# Patient Record
Sex: MALE | Race: WHITE | Employment: UNEMPLOYED | URBAN - METROPOLITAN AREA
[De-identification: names, ages, dates, MRNs, and addresses within clinical notes are randomized per-mention and may not be internally consistent; named-entity substitution may affect disease eponyms.]

---

## 2017-03-01 ENCOUNTER — PROCEDURE VISIT (OUTPATIENT)
Dept: CARDIOLOGY CLINIC | Age: 32
End: 2017-03-01

## 2017-03-01 ENCOUNTER — OFFICE VISIT (OUTPATIENT)
Dept: CARDIOLOGY CLINIC | Age: 32
End: 2017-03-01

## 2017-03-01 VITALS
HEIGHT: 67 IN | WEIGHT: 199 LBS | BODY MASS INDEX: 31.23 KG/M2 | SYSTOLIC BLOOD PRESSURE: 122 MMHG | HEART RATE: 78 BPM | DIASTOLIC BLOOD PRESSURE: 70 MMHG | OXYGEN SATURATION: 97 %

## 2017-03-01 DIAGNOSIS — I42.8 CARDIOMYOPATHY, NONISCHEMIC (HCC): ICD-10-CM

## 2017-03-01 DIAGNOSIS — Z13.9 SCREENING: ICD-10-CM

## 2017-03-01 DIAGNOSIS — E78.5 HYPERLIPIDEMIA, UNSPECIFIED HYPERLIPIDEMIA TYPE: ICD-10-CM

## 2017-03-01 DIAGNOSIS — I50.22 CHRONIC SYSTOLIC HEART FAILURE (HCC): ICD-10-CM

## 2017-03-01 DIAGNOSIS — Z95.810 CARDIAC DEFIBRILLATOR IN SITU: ICD-10-CM

## 2017-03-01 DIAGNOSIS — Q23.1 BICUSPID AORTIC VALVE: Primary | ICD-10-CM

## 2017-03-01 DIAGNOSIS — Z95.810 CARDIAC DEFIBRILLATOR IN SITU: Primary | ICD-10-CM

## 2017-03-01 DIAGNOSIS — Z95.2 S/P AVR (AORTIC VALVE REPLACEMENT): ICD-10-CM

## 2017-03-01 PROCEDURE — 93283 PRGRMG EVAL IMPLANTABLE DFB: CPT | Performed by: INTERNAL MEDICINE

## 2017-03-01 PROCEDURE — G8417 CALC BMI ABV UP PARAM F/U: HCPCS | Performed by: INTERNAL MEDICINE

## 2017-03-01 PROCEDURE — 99214 OFFICE O/P EST MOD 30 MIN: CPT | Performed by: INTERNAL MEDICINE

## 2017-03-01 PROCEDURE — 93290 INTERROG DEV EVAL ICPMS IP: CPT | Performed by: INTERNAL MEDICINE

## 2017-03-01 PROCEDURE — 4004F PT TOBACCO SCREEN RCVD TLK: CPT | Performed by: INTERNAL MEDICINE

## 2017-03-01 PROCEDURE — G8484 FLU IMMUNIZE NO ADMIN: HCPCS | Performed by: INTERNAL MEDICINE

## 2017-03-01 PROCEDURE — G8427 DOCREV CUR MEDS BY ELIG CLIN: HCPCS | Performed by: INTERNAL MEDICINE

## 2017-03-01 RX ORDER — LISINOPRIL 2.5 MG/1
2.5 TABLET ORAL DAILY
Qty: 30 TABLET | Refills: 3 | Status: SHIPPED | OUTPATIENT
Start: 2017-03-01 | End: 2017-12-29 | Stop reason: SDUPTHER

## 2017-03-01 RX ORDER — CARVEDILOL 6.25 MG/1
6.25 TABLET ORAL 2 TIMES DAILY WITH MEALS
Qty: 60 TABLET | Refills: 11 | Status: SHIPPED | OUTPATIENT
Start: 2017-03-01 | End: 2018-04-10 | Stop reason: SDUPTHER

## 2017-04-20 ENCOUNTER — TELEPHONE (OUTPATIENT)
Dept: CARDIOLOGY CLINIC | Age: 32
End: 2017-04-20

## 2017-04-21 RX ORDER — FUROSEMIDE 20 MG/1
20 TABLET ORAL DAILY
Qty: 30 TABLET | Refills: 6 | Status: SHIPPED | OUTPATIENT
Start: 2017-04-21 | End: 2018-02-05 | Stop reason: SDUPTHER

## 2017-12-28 ENCOUNTER — TELEPHONE (OUTPATIENT)
Dept: CARDIOLOGY CLINIC | Age: 32
End: 2017-12-28

## 2017-12-28 DIAGNOSIS — I42.8 CARDIOMYOPATHY, NONISCHEMIC (HCC): ICD-10-CM

## 2017-12-28 DIAGNOSIS — I50.22 CHRONIC SYSTOLIC HEART FAILURE (HCC): ICD-10-CM

## 2017-12-28 DIAGNOSIS — Z95.2 S/P AVR (AORTIC VALVE REPLACEMENT): ICD-10-CM

## 2017-12-29 RX ORDER — LISINOPRIL 2.5 MG/1
2.5 TABLET ORAL DAILY
Qty: 30 TABLET | Refills: 0 | Status: SHIPPED | OUTPATIENT
Start: 2017-12-29 | End: 2018-02-05 | Stop reason: SDUPTHER

## 2018-01-30 ENCOUNTER — OFFICE VISIT (OUTPATIENT)
Dept: CARDIOLOGY CLINIC | Age: 33
End: 2018-01-30

## 2018-01-30 ENCOUNTER — PROCEDURE VISIT (OUTPATIENT)
Dept: CARDIOLOGY CLINIC | Age: 33
End: 2018-01-30

## 2018-01-30 ENCOUNTER — TELEPHONE (OUTPATIENT)
Dept: CARDIOLOGY CLINIC | Age: 33
End: 2018-01-30

## 2018-01-30 VITALS
BODY MASS INDEX: 29.57 KG/M2 | SYSTOLIC BLOOD PRESSURE: 118 MMHG | DIASTOLIC BLOOD PRESSURE: 88 MMHG | HEART RATE: 105 BPM | WEIGHT: 184 LBS | HEIGHT: 66 IN | OXYGEN SATURATION: 97 %

## 2018-01-30 DIAGNOSIS — I42.8 CARDIOMYOPATHY, NONISCHEMIC (HCC): Primary | ICD-10-CM

## 2018-01-30 DIAGNOSIS — I35.0 NONRHEUMATIC AORTIC VALVE STENOSIS: ICD-10-CM

## 2018-01-30 DIAGNOSIS — Z45.02 ICD (IMPLANTABLE CARDIOVERTER-DEFIBRILLATOR) BATTERY DEPLETION: ICD-10-CM

## 2018-01-30 DIAGNOSIS — I49.01 VF (VENTRICULAR FIBRILLATION) (HCC): ICD-10-CM

## 2018-01-30 DIAGNOSIS — I50.22 CHRONIC SYSTOLIC HEART FAILURE (HCC): ICD-10-CM

## 2018-01-30 DIAGNOSIS — I42.8 CARDIOMYOPATHY, NONISCHEMIC (HCC): ICD-10-CM

## 2018-01-30 DIAGNOSIS — Z95.2 S/P AVR: ICD-10-CM

## 2018-01-30 DIAGNOSIS — I42.8 NON-ISCHEMIC CARDIOMYOPATHY (HCC): Primary | ICD-10-CM

## 2018-01-30 DIAGNOSIS — Z95.2 S/P AVR (AORTIC VALVE REPLACEMENT): ICD-10-CM

## 2018-01-30 DIAGNOSIS — Z95.810 CARDIAC DEFIBRILLATOR IN SITU: ICD-10-CM

## 2018-01-30 PROCEDURE — G8419 CALC BMI OUT NRM PARAM NOF/U: HCPCS | Performed by: INTERNAL MEDICINE

## 2018-01-30 PROCEDURE — G8428 CUR MEDS NOT DOCUMENT: HCPCS | Performed by: INTERNAL MEDICINE

## 2018-01-30 PROCEDURE — 93283 PRGRMG EVAL IMPLANTABLE DFB: CPT | Performed by: INTERNAL MEDICINE

## 2018-01-30 PROCEDURE — 93290 INTERROG DEV EVAL ICPMS IP: CPT | Performed by: INTERNAL MEDICINE

## 2018-01-30 PROCEDURE — G8484 FLU IMMUNIZE NO ADMIN: HCPCS | Performed by: INTERNAL MEDICINE

## 2018-01-30 PROCEDURE — 99999 PR OFFICE/OUTPT VISIT,PROCEDURE ONLY: CPT | Performed by: NURSE PRACTITIONER

## 2018-01-30 PROCEDURE — 99205 OFFICE O/P NEW HI 60 MIN: CPT | Performed by: INTERNAL MEDICINE

## 2018-01-30 PROCEDURE — 4004F PT TOBACCO SCREEN RCVD TLK: CPT | Performed by: INTERNAL MEDICINE

## 2018-01-30 NOTE — PROGRESS NOTES
[address]   Electrophysiology Consultation   Date: 1/30/2018  Reason for Consultation: AICD battery depletion     CC: Fall   HPI: Jesi Levine II is a 34 y.o. history of non-ischemic cardiomyopathy with LV dysfunction s/p AICD implantation 2/15/2010 br Dr Chris Allison at [facility], history of AICD shock in the past per patient, history of bicuspid aortic valve s/p valve replacement at [facility] here today and found to have his device battery is depleted. He has CLARK. He states that he is compliant with his medical therapy. Past Medical History:   Diagnosis Date    Aortic stenosis     CHF (congestive heart failure) (HCC)     Hypertension         Past Surgical History:   Procedure Laterality Date    AORTIC VALVE SURGERY      CARDIAC VALVE REPLACEMENT      big valve     PACEMAKER PLACEMENT      ICD     Allergies   Allergen Reactions    Flexeril [Cyclobenzaprine]        Social History:   reports that he has never smoked. He uses smokeless tobacco. He reports that he drinks alcohol. He reports that he does not use drugs. Family History:  family history includes Heart Disease in his paternal grandmother. Review of System:  All other systems reviewed and are negative except for that noted above. Pertinent negatives are:   General: negative for fever, chills   Ophthalmic ROS: negative for - eye pain or loss of vision  ENT ROS: negative for - headaches, sore throat   Respiratory: negative for - cough, sputum  Cardiovascular: Reviewed in HPI  Gastrointestinal: negative for - abdominal pain, diarrhea, N/V  Hematology: negative for - bleeding, blood clots, bruising or jaundice  Genito-Urinary:  negative for - Dysuria or incontinence  Musculoskeletal: negative for - Joint swelling, muscle pain  Neurological: negative for - confusion, dizziness, headaches   Psychiatric: No anxiety, no depression. Dermatological: negative for - rash    Physical Examination:  There were no vitals filed for this visit.

## 2018-01-30 NOTE — TELEPHONE ENCOUNTER
DANA spoke to key in Cath lab, this fella needs gen change which will be done tomorrow. Not sure if you ladies need to know!     Thanks

## 2018-01-30 NOTE — PROGRESS NOTES
Pt seen by DR. Wyatt Amin today, after device interrogated and found to be EOL, and has been EOL for many months, interrogation showed device shock for VT/VF event. Risks, options, benefits discussed with him by me (with witness Maty Vega) and then by DR. Wyatt Amin with me in room  D/w him need for device replacement, he could stay today and get done today but he wants to go home and come back tomorrow for change.   He understands risk of SCD and risks of not getting done today

## 2018-02-01 NOTE — COMMUNICATION BODY
Pt seen by DR. Edmund Joseph today, after device interrogated and found to be EOL, and has been EOL for many months, interrogation showed device shock for VT/VF event. Risks, options, benefits discussed with him by me (with witness Corrine Banda) and then by DR. Edmund Joseph with me in room  D/w him need for device replacement, he could stay today and get done today but he wants to go home and come back tomorrow for change.   He understands risk of SCD and risks of not getting done today

## 2018-02-05 DIAGNOSIS — I50.22 CHRONIC SYSTOLIC HEART FAILURE (HCC): ICD-10-CM

## 2018-02-05 DIAGNOSIS — I42.8 CARDIOMYOPATHY, NONISCHEMIC (HCC): ICD-10-CM

## 2018-02-05 DIAGNOSIS — Z95.2 S/P AVR (AORTIC VALVE REPLACEMENT): ICD-10-CM

## 2018-02-05 RX ORDER — FUROSEMIDE 20 MG/1
20 TABLET ORAL DAILY
Qty: 30 TABLET | Refills: 6 | Status: SHIPPED | OUTPATIENT
Start: 2018-02-05 | End: 2018-04-10 | Stop reason: SDUPTHER

## 2018-02-05 RX ORDER — LISINOPRIL 2.5 MG/1
2.5 TABLET ORAL DAILY
Qty: 30 TABLET | Refills: 0 | Status: SHIPPED | OUTPATIENT
Start: 2018-02-05 | End: 2018-04-10 | Stop reason: SDUPTHER

## 2018-04-10 DIAGNOSIS — Z95.2 S/P AVR (AORTIC VALVE REPLACEMENT): ICD-10-CM

## 2018-04-10 DIAGNOSIS — I50.22 CHRONIC SYSTOLIC HEART FAILURE (HCC): ICD-10-CM

## 2018-04-10 DIAGNOSIS — I42.8 CARDIOMYOPATHY, NONISCHEMIC (HCC): ICD-10-CM

## 2018-04-10 RX ORDER — CARVEDILOL 6.25 MG/1
6.25 TABLET ORAL 2 TIMES DAILY WITH MEALS
Qty: 60 TABLET | Refills: 11 | Status: SHIPPED | OUTPATIENT
Start: 2018-04-10 | End: 2019-05-29 | Stop reason: ALTCHOICE

## 2018-04-10 RX ORDER — LISINOPRIL 2.5 MG/1
2.5 TABLET ORAL DAILY
Qty: 30 TABLET | Refills: 11 | Status: SHIPPED | OUTPATIENT
Start: 2018-04-10 | End: 2019-05-29 | Stop reason: ALTCHOICE

## 2018-04-10 RX ORDER — FUROSEMIDE 20 MG/1
20 TABLET ORAL DAILY
Qty: 30 TABLET | Refills: 11 | Status: SHIPPED | OUTPATIENT
Start: 2018-04-10 | End: 2019-05-29 | Stop reason: ALTCHOICE

## 2019-05-23 RX ORDER — FUROSEMIDE 20 MG/1
TABLET ORAL
Qty: 30 TABLET | Refills: 10 | OUTPATIENT
Start: 2019-05-23

## 2019-05-29 ENCOUNTER — PROCEDURE VISIT (OUTPATIENT)
Dept: CARDIOLOGY CLINIC | Age: 34
End: 2019-05-29
Payer: MEDICARE

## 2019-05-29 ENCOUNTER — OFFICE VISIT (OUTPATIENT)
Dept: CARDIOLOGY CLINIC | Age: 34
End: 2019-05-29
Payer: MEDICARE

## 2019-05-29 VITALS
HEART RATE: 95 BPM | HEIGHT: 66 IN | OXYGEN SATURATION: 97 % | BODY MASS INDEX: 29.7 KG/M2 | SYSTOLIC BLOOD PRESSURE: 110 MMHG | DIASTOLIC BLOOD PRESSURE: 80 MMHG

## 2019-05-29 DIAGNOSIS — I49.01 VF (VENTRICULAR FIBRILLATION) (HCC): Primary | ICD-10-CM

## 2019-05-29 DIAGNOSIS — I42.8 NON-ISCHEMIC CARDIOMYOPATHY (HCC): ICD-10-CM

## 2019-05-29 DIAGNOSIS — Z95.810 CARDIAC DEFIBRILLATOR IN SITU: ICD-10-CM

## 2019-05-29 DIAGNOSIS — I50.22 CHRONIC SYSTOLIC HEART FAILURE (HCC): ICD-10-CM

## 2019-05-29 PROCEDURE — 99213 OFFICE O/P EST LOW 20 MIN: CPT | Performed by: NURSE PRACTITIONER

## 2019-05-29 PROCEDURE — G8419 CALC BMI OUT NRM PARAM NOF/U: HCPCS | Performed by: NURSE PRACTITIONER

## 2019-05-29 PROCEDURE — G8427 DOCREV CUR MEDS BY ELIG CLIN: HCPCS | Performed by: NURSE PRACTITIONER

## 2019-05-29 PROCEDURE — 4004F PT TOBACCO SCREEN RCVD TLK: CPT | Performed by: NURSE PRACTITIONER

## 2019-05-29 PROCEDURE — 93283 PRGRMG EVAL IMPLANTABLE DFB: CPT | Performed by: INTERNAL MEDICINE

## 2019-05-29 PROCEDURE — 93290 INTERROG DEV EVAL ICPMS IP: CPT | Performed by: INTERNAL MEDICINE

## 2019-05-29 PROCEDURE — 93000 ELECTROCARDIOGRAM COMPLETE: CPT | Performed by: NURSE PRACTITIONER

## 2019-05-29 RX ORDER — FUROSEMIDE 20 MG/1
TABLET ORAL
Qty: 60 TABLET | Refills: 1 | Status: SHIPPED | OUTPATIENT
Start: 2019-05-29 | End: 2019-06-24 | Stop reason: SDUPTHER

## 2019-05-29 RX ORDER — CARVEDILOL 6.25 MG/1
6.25 TABLET ORAL DAILY
Qty: 30 TABLET | Refills: 5 | Status: SHIPPED | OUTPATIENT
Start: 2019-05-29 | End: 2019-06-25 | Stop reason: DRUGHIGH

## 2019-05-29 NOTE — PROGRESS NOTES
Patient comes in for programming evaluation for his defibrillator. All sensing and pacing parameters are within normal range. Pt has not been seen since January 2018. He has multiple recordings of SVT. He was shocked for VT back in November. His optivol is elevated. He states he has gained 30 pounds in past few weeks. Hes been off all his meds as well. No changes need to be made at this time. Please see interrogation for more detail. Patient will see Fatmata Miner today.

## 2019-05-29 NOTE — PROGRESS NOTES
Allergies: Allergies   Allergen Reactions    Flexeril [Cyclobenzaprine]        Social History:  Reviewed. reports that he has never smoked. He uses smokeless tobacco. He reports that he drinks alcohol. He reports that he does not use drugs. Family History:  Reviewed. family history includes Heart Disease in his paternal grandmother. Review of System:  All other systems reviewed and are negative except for that noted above. Pertinent negatives are:     · General: negative for fever, chills   · Ophthalmic ROS: negative for - eye pain or loss of vision  · ENT ROS: negative for - headaches, sore throat   · Respiratory: negative for - cough, sputum  · Cardiovascular: Reviewed in HPI  · Gastrointestinal: negative for - abdominal pain, diarrhea, N/V  · Hematology: negative for - bleeding, blood clots, bruising or jaundice  · Genito-Urinary:  negative for - Dysuria or incontinence  · Musculoskeletal: negative for - Joint swelling, muscle pain  · Neurological: negative for - confusion, dizziness, headaches   · Psychiatric: No anxiety, no depression. · Dermatological: negative for - rash    Physical Examination:  Vitals:    05/29/19 1509   BP: 110/80   Pulse: 95   SpO2: 97%        · Constitutional: Oriented. No distress. · Head: Normocephalic and atraumatic. · Mouth/Throat: Oropharynx is clear and moist.   · Eyes: Conjunctivae normal. EOM are normal.   · Neck: Neck supple. No rigidity. No JVD present. · Cardiovascular: Normal rate, regular rhythm, S1&S2. · Pulmonary/Chest: Bilateral respiratory sounds. No wheezes, No rhonchi. · Abdominal: Soft. Bowel sounds present. No distension, No tenderness. · Musculoskeletal: No tenderness. No edema    · Lymphadenopathy: Has no cervical adenopathy. · Neurological: Alert and oriented. Cranial nerve appears intact, No Gross deficit   · Skin: Skin is warm and dry. No rash noted.    · Psychiatric: Has a normal behavior     Labs, diagnostic and imaging results reviewed. Last Echo EF 30%    Medication:  Current Outpatient Medications   Medication Sig Dispense Refill    carvedilol (COREG) 6.25 MG tablet Take 1 tablet by mouth daily 30 tablet 5    furosemide (LASIX) 20 MG tablet Take 1 tablet twice a day for 3 days then decrease to once a day 60 tablet 1    aspirin 81 MG tablet Take 81 mg by mouth daily. No current facility-administered medications for this visit. Previous OV Assessment and plan 1/31/18   - Ventricular fibrillation:               Device interrogated today. One episode of VF on 9/2/2017 detected and terminated with AICD shock. Patient reports syncope and fall at the time. History of severe LV dysfunction and cardiomyopathy. Continue with aggressive medical therapy and risk factor modifications. Coreg              Lisinopril               ASA                  If recurrent episodes, will consider amiodarone, although he is young and amiodarone has toxicity.      - Non-Ischemic cardiomyopathy s/p AICD in 2010, EF: 30%, NYHAFC II               Episode of VF treated with AICD shock. · Minimal pacing. · Device battery is EOS and needs to be replaced. Discussed with patient. Risks, benefits and alternative of procedure were explained. All questions answered. Initially he was hesitant to proceed with it. He appears to be depressed. Risk of SCD discussed in detail. He verbalized understanding. Recommended changing pulse generator today and offered admission but he declined. He has had VF and replacing generator for secondary prevention is needed. Scheduled patient for generator change out tomorrow and he agreed with proceeding with it. Bicuspid aortic valve - congenital. S/p aortic valvuloplasty as an infant and had porcine AVR in 1/2014 at [facility].       Today's OV Assessment/Plan  Presents today for medication refills and device interrogation. Had gen change 1/31/18 and has not followed up since. He has been out of all his medications except coreg for 2 months. He has been taking 1 Coreg daily to make it last longer. He states that he has gained about 30 lbs in @ 2 months. Highly emotional and depressed during office today. He states that he has multiple family, personal and social stressors. He states that he's not important enough to see a Dr. And always gets scheduled w/NPs because the Dr.s don't want to see him. Grossly volume up on physical exam, ble and gut edema. Optivol has been trending up for 6 weeks w/thoracic impedence (-) congruence. Had 1 shock in 11/2018. Multiple PVC's on EKG today. Low EF in addition to fluid overload and med noncompliance are further exacerbating PVCs     --->Hightly recommended inpatient admission, however he refused --long discussion. Mr. Partha Sandoval also declined treatment at the infusion center today, or labs today. 1) Continue Coreg 6.25 mg twice a day, was only taking once a day    2) Lasix 20 mg twice a day for 3 days then once a day, was not taking any for 2 months, would have preferred IV route, but pt refused, also refused labs    3) Please consider where you would like get your labs--it's really important to get them    4) Follow up with Dr. Alexis Simons in 2-4 weeks    5) Follow up with Dr. aKlani Preciado in 3-4 months    6) Will consider restarting lisinopril after labs.   Might consider amiodarone at f/up    -----He is very depressed, refusing admission, refusing labs here today due to prior hematoma, having a a different phylbotimist doesn't change his decision  -----Optimal plan would be for admission and IV diureses  ------No amio today, caution in prescribing drugs w/an OD/proarrythmia potential  ------Denies active suicidal plan, declines psych referral    I independently reviewed*device check interrogation     Over 45 minutes were utilized in face to face discussion NOTE: This report was transcribed using voice recognition software. Every effort was made to ensure accuracy, however, inadvertent computerized transcription errors may be present.        CONNOR Mckeon  [facility]   Office: [phone]

## 2019-06-24 ENCOUNTER — OFFICE VISIT (OUTPATIENT)
Dept: CARDIOLOGY CLINIC | Age: 34
End: 2019-06-24
Payer: MEDICARE

## 2019-06-24 VITALS
WEIGHT: 200.7 LBS | BODY MASS INDEX: 31.5 KG/M2 | OXYGEN SATURATION: 97 % | SYSTOLIC BLOOD PRESSURE: 100 MMHG | HEART RATE: 84 BPM | HEIGHT: 67 IN | DIASTOLIC BLOOD PRESSURE: 64 MMHG

## 2019-06-24 DIAGNOSIS — Z45.02 ICD (IMPLANTABLE CARDIOVERTER-DEFIBRILLATOR) BATTERY DEPLETION: ICD-10-CM

## 2019-06-24 DIAGNOSIS — Z95.2 S/P AVR: ICD-10-CM

## 2019-06-24 DIAGNOSIS — I50.22 CHRONIC SYSTOLIC HEART FAILURE (HCC): Primary | ICD-10-CM

## 2019-06-24 DIAGNOSIS — Z95.810 CARDIAC DEFIBRILLATOR IN SITU: ICD-10-CM

## 2019-06-24 DIAGNOSIS — E03.9 HYPOTHYROIDISM, UNSPECIFIED TYPE: ICD-10-CM

## 2019-06-24 DIAGNOSIS — E78.5 HYPERLIPIDEMIA, UNSPECIFIED HYPERLIPIDEMIA TYPE: ICD-10-CM

## 2019-06-24 DIAGNOSIS — R06.02 SOB (SHORTNESS OF BREATH): ICD-10-CM

## 2019-06-24 PROCEDURE — 99214 OFFICE O/P EST MOD 30 MIN: CPT | Performed by: INTERNAL MEDICINE

## 2019-06-24 PROCEDURE — G8417 CALC BMI ABV UP PARAM F/U: HCPCS | Performed by: INTERNAL MEDICINE

## 2019-06-24 PROCEDURE — G8427 DOCREV CUR MEDS BY ELIG CLIN: HCPCS | Performed by: INTERNAL MEDICINE

## 2019-06-24 RX ORDER — LISINOPRIL 2.5 MG/1
2.5 TABLET ORAL DAILY
Qty: 90 TABLET | Refills: 3 | Status: SHIPPED | OUTPATIENT
Start: 2019-06-24 | End: 2020-07-29 | Stop reason: SDUPTHER

## 2019-06-24 RX ORDER — DIGOXIN 125 MCG
125 TABLET ORAL DAILY
Qty: 90 TABLET | Refills: 3 | Status: SHIPPED | OUTPATIENT
Start: 2019-06-24 | End: 2020-07-24

## 2019-06-24 RX ORDER — CARVEDILOL 6.25 MG/1
6.25 TABLET ORAL 2 TIMES DAILY WITH MEALS
Qty: 180 TABLET | Refills: 3 | Status: SHIPPED | OUTPATIENT
Start: 2019-06-24 | End: 2020-07-29 | Stop reason: SDUPTHER

## 2019-06-24 NOTE — PATIENT INSTRUCTIONS
Plan:  1. Start Digoxin 0.125mg daily. 2.  Take Lasix 20mg daily  3. Increase Coreg to 6.25mg twice daily  4. Start Lisinopril 2.5mg daily  5. Labs fasting (nothing to eat for 8 hours) soon  6. See Dr. Sofia in 2 months with an Echo.

## 2019-06-24 NOTE — PROGRESS NOTES
[facility]  Advanced CHF/Pulmonary Hypertension   Cardiac Evaluation      Julisa Clark II  YOB: 1985    Date of Visit:  6/24/19      Chief Complaint   Patient presents with    Congestive Heart Failure        History of Present Illness:  Julisa Clark II is a 28 y.o. male who presents from referral from Dr. Alfred Mathis for consultation and management of non-ischemic cardiomyopathy with EF 30%. He has a history of non-ischemic cardiomyopathy with LV dysfunction s/p AICD implantation 2/15/2010 br Dr Amelia Fuentes at [facility]. He had a VF episode in September 2017 with syncope treated with AICD shock. History of s/p congenital aortic valvuloplasty as an infant and bicuspid aortic valve s/p valve replacement at [address] [place] [address] in 1-2014. He had generator change on 1- and has not followed up since that time with EP until seen by Roopa Espinal NP in May 2019. At that time, he had been non compliant with medications taking Coreg only once a day to make it last longer, weight up 30 pounds in 2 months, stated increased depression with family stressors and feeling decreased importance to seek treatment recommendation at that time. Today he is here for follow up heart failure. States that lisinopril was stopped. He thinks he misunderstood how to take the lasix and he has lost 5 pounds. The Optivol is improved today from last visit. Impedance monitoring via [employer] Optivol Cardiac Compass was downloaded from patient's ICD and reviewed with the patient. The impedance shows stable fluid level. In May, he was taking Lasix 3 days a week 40mg and 4 days a week, he was taking 20mg. He was in a \"depression patch\" and states he has stretching out his meds and that was \"going to be it. \"  He states he was not on Digoxin in the past.  He is concerned about the cost of meds and the office visit costs. Discussed Entresto this visit but first getting back on a good medication regimen.   He states he Physical activity:     Days per week: Not on file     Minutes per session: Not on file    Stress: Not on file   Relationships    Social connections:     Talks on phone: Not on file     Gets together: Not on file     Attends [employer] service: Not on file     Active member of club or organization: Not on file     Attends meetings of clubs or organizations: Not on file     Relationship status: Not on file    Intimate partner violence:     Fear of current or ex partner: Not on file     Emotionally abused: Not on file     Physically abused: Not on file     Forced sexual activity: Not on file   Other Topics Concern    Not on file   Social History Narrative    Not on file       Review of Systems:   · Constitutional: there has been no unanticipated weight loss. There's been no change in energy level, sleep pattern, or activity level. · Eyes: No visual changes or diplopia. No scleral icterus. · ENT: No Headaches, hearing loss or vertigo. No mouth sores or sore throat. · Cardiovascular: Reviewed in HPI  · Respiratory: No cough or wheezing, no sputum production. No hematemesis. · Gastrointestinal: No abdominal pain, appetite loss, blood in stools. No change in bowel or bladder habits. · Genitourinary: No dysuria, trouble voiding, or hematuria. · Musculoskeletal:  No gait disturbance, weakness or joint complaints. · Integumentary: No rash or pruritis. · Neurological: No headache, diplopia, change in muscle strength, numbness or tingling. No change in gait, balance, coordination, mood, affect, memory, mentation, behavior. · Psychiatric: No anxiety, no depression. · Endocrine: No malaise, fatigue or temperature intolerance. No excessive thirst, fluid intake, or urination. No tremor. · Hematologic/Lymphatic: No abnormal bruising or bleeding, blood clots or swollen lymph nodes. · Allergic/Immunologic: No nasal congestion or hives.     Physical Examination:    Vitals:    06/24/19 0959   BP: 100/64   Pulse: 84 SpO2: 97%   Weight: 200 lb 11.2 oz (91 kg)   Height: 5' 7\" (1.702 m)     Body mass index is 31.43 kg/m². Wt Readings from Last 3 Encounters:   06/24/19 200 lb 11.2 oz (91 kg)   01/31/18 184 lb (83.5 kg)   01/30/18 184 lb (83.5 kg)     BP Readings from Last 3 Encounters:   06/24/19 100/64   05/29/19 110/80   01/30/18 118/88     Constitutional and General Appearance:   WD/WN in NAD  HEENT:  NC/AT  EVA  No problems with hearing  Skin:  Warm, dry  Respiratory:  · Normal excursion and expansion without use of accessory muscles  · Resp Auscultation: Normal breath sounds without dullness  Cardiovascular:  · The apical impulses not displaced  · Heart tones are crisp and normal  · Cervical veins are not engorged  · The carotid upstroke is normal in amplitude and contour without delay or bruit  · JVP less than 8 cm H2O  RRR with nl S1 and S2 without m,r,g  · Peripheral pulses are symmetrical and full  · There is no clubbing, cyanosis of the extremities. · No edema  · Femoral Arteries: 2+ and equal  · Pedal Pulses: 2+ and equal   Neck:  · No thyromegaly  Abdomen:  · No masses or tenderness  · Liver/Spleen: No Abnormalities Noted  Neurological/Psychiatric:  · Alert and oriented in all spheres  · Moves all extremities well  · Exhibits normal gait balance and coordination  · No abnormalities of mood, affect, memory, mentation, or behavior are noted    Procedures performed:  1-  Dr. Maurilio Aguilar  · Generator change out of dual chamber AICD   · Electronic analysis of lead and device. · IV sedation. Echo 3-11-14  LVEF 30%  Mean Gradient 15mmHg  No pulmonary hypertension identified    Labs were reviewed including labs from other hospital systems through [facility]. Cardiac testing was reviewed including echos, nuclear scans, cardiac catheterization, including from other hospital systems through [facility]. Assessment:    1. Chronic systolic heart failure (HCC)    2. Cardiac defibrillator in situ    3.  ICD CHF and aFib on anticoagulation:  NA    I appreciate the opportunity of cooperating in the care of this patient.     Paulino Martinez M.D., [facility]

## 2019-06-26 RX ORDER — FUROSEMIDE 20 MG/1
TABLET ORAL
Qty: 110 TABLET | Refills: 3 | Status: SHIPPED | OUTPATIENT
Start: 2019-06-26 | End: 2020-07-29 | Stop reason: SDUPTHER

## 2019-08-20 ENCOUNTER — APPOINTMENT (OUTPATIENT)
Dept: NON INVASIVE DIAGNOSTICS | Age: 34
End: 2019-08-20
Payer: MEDICARE

## 2019-08-20 ENCOUNTER — HOSPITAL ENCOUNTER (OUTPATIENT)
Dept: NON INVASIVE DIAGNOSTICS | Age: 34
Discharge: HOME | End: 2019-08-20
Payer: MEDICARE

## 2019-08-20 DIAGNOSIS — R06.02 SOB (SHORTNESS OF BREATH): ICD-10-CM

## 2019-08-20 DIAGNOSIS — Z95.810 CARDIAC DEFIBRILLATOR IN SITU: ICD-10-CM

## 2019-08-20 DIAGNOSIS — I50.22 CHRONIC SYSTOLIC HEART FAILURE (HCC): ICD-10-CM

## 2019-08-20 LAB
LV EF: 23 %
LVEF MODALITY: NORMAL

## 2019-08-20 PROCEDURE — 93306 TTE W/DOPPLER COMPLETE: CPT

## 2019-08-26 ENCOUNTER — NURSE ONLY (OUTPATIENT)
Dept: CARDIOLOGY CLINIC | Age: 34
End: 2019-08-26
Payer: MEDICARE

## 2019-08-26 ENCOUNTER — OFFICE VISIT (OUTPATIENT)
Dept: CARDIOLOGY CLINIC | Age: 34
End: 2019-08-26
Payer: MEDICARE

## 2019-08-26 VITALS
SYSTOLIC BLOOD PRESSURE: 100 MMHG | DIASTOLIC BLOOD PRESSURE: 60 MMHG | HEIGHT: 66 IN | BODY MASS INDEX: 32.78 KG/M2 | WEIGHT: 204 LBS

## 2019-08-26 DIAGNOSIS — I50.22 CHRONIC SYSTOLIC HEART FAILURE (HCC): Primary | ICD-10-CM

## 2019-08-26 DIAGNOSIS — R06.02 SOB (SHORTNESS OF BREATH): ICD-10-CM

## 2019-08-26 DIAGNOSIS — Z95.810 CARDIAC DEFIBRILLATOR IN SITU: ICD-10-CM

## 2019-08-26 DIAGNOSIS — E78.5 HYPERLIPIDEMIA, UNSPECIFIED HYPERLIPIDEMIA TYPE: ICD-10-CM

## 2019-08-26 DIAGNOSIS — I50.22 CHRONIC SYSTOLIC HEART FAILURE (HCC): ICD-10-CM

## 2019-08-26 DIAGNOSIS — I42.8 NON-ISCHEMIC CARDIOMYOPATHY (HCC): ICD-10-CM

## 2019-08-26 DIAGNOSIS — Z95.2 S/P AVR: ICD-10-CM

## 2019-08-26 DIAGNOSIS — I49.01 VF (VENTRICULAR FIBRILLATION) (HCC): ICD-10-CM

## 2019-08-26 PROCEDURE — G8417 CALC BMI ABV UP PARAM F/U: HCPCS | Performed by: INTERNAL MEDICINE

## 2019-08-26 PROCEDURE — G8427 DOCREV CUR MEDS BY ELIG CLIN: HCPCS | Performed by: INTERNAL MEDICINE

## 2019-08-26 PROCEDURE — 99214 OFFICE O/P EST MOD 30 MIN: CPT | Performed by: INTERNAL MEDICINE

## 2019-08-26 PROCEDURE — 93290 INTERROG DEV EVAL ICPMS IP: CPT | Performed by: INTERNAL MEDICINE

## 2019-08-26 PROCEDURE — 4004F PT TOBACCO SCREEN RCVD TLK: CPT | Performed by: INTERNAL MEDICINE

## 2019-08-26 PROCEDURE — 93283 PRGRMG EVAL IMPLANTABLE DFB: CPT | Performed by: INTERNAL MEDICINE

## 2019-11-21 ENCOUNTER — PROCEDURE VISIT (OUTPATIENT)
Dept: CARDIOLOGY CLINIC | Age: 34
End: 2019-11-21

## 2019-11-21 DIAGNOSIS — Z95.810 CARDIAC DEFIBRILLATOR IN PLACE: ICD-10-CM

## 2019-12-03 ENCOUNTER — NURSE ONLY (OUTPATIENT)
Dept: CARDIOLOGY CLINIC | Age: 34
End: 2019-12-03
Payer: MEDICARE

## 2019-12-03 DIAGNOSIS — I50.22 CHRONIC SYSTOLIC HEART FAILURE (HCC): ICD-10-CM

## 2019-12-03 DIAGNOSIS — Z95.810 CARDIAC DEFIBRILLATOR IN PLACE: ICD-10-CM

## 2019-12-03 DIAGNOSIS — I42.8 NON-ISCHEMIC CARDIOMYOPATHY (HCC): ICD-10-CM

## 2019-12-03 PROCEDURE — 93295 DEV INTERROG REMOTE 1/2/MLT: CPT | Performed by: INTERNAL MEDICINE

## 2019-12-03 PROCEDURE — 93297 REM INTERROG DEV EVAL ICPMS: CPT | Performed by: INTERNAL MEDICINE

## 2019-12-03 PROCEDURE — 93296 REM INTERROG EVL PM/IDS: CPT | Performed by: INTERNAL MEDICINE

## 2020-03-10 ENCOUNTER — NURSE ONLY (OUTPATIENT)
Dept: CARDIOLOGY CLINIC | Age: 35
End: 2020-03-10
Payer: MEDICARE

## 2020-03-10 PROCEDURE — 93296 REM INTERROG EVL PM/IDS: CPT | Performed by: INTERNAL MEDICINE

## 2020-03-10 PROCEDURE — 93295 DEV INTERROG REMOTE 1/2/MLT: CPT | Performed by: INTERNAL MEDICINE

## 2020-03-10 NOTE — LETTER
[address] [facility] [phone]  [address] [facility] [phone]    Pacemaker/Defibrillator Clinic          03/10/20        Twila Duffy II  [address] [place] [address]        Dear Twila Duffy II    This letter is to inform you that we received the transmission from your monitor at home that checks your implanted heart device. The next date your monitor will automatically transmit will be 6-10-20. If your report needs attention we will notify you. Your device and monitor are wireless and most transmit cellularly, but please periodically check your monitor is still plugged in to the electrical outlet. If you still use the telephone land line to send please ensure the connection to the phone erick is secure. This will help to ensure successful automatic transmissions in the future. Also, the monitor needs to be close to you while sleeping at night. Please be aware that the remote device transmission sites are periodically monitored only during regular business hours during which simultaneous in-office device clinics are being run. If your transmission requires attention, we will contact you as soon as possible. Thank you.             [facility]

## 2020-03-10 NOTE — PROGRESS NOTES
Carelink transmission shows normal sensing and pacing function. Noted ST and NSVT. See interrogation for more details. Optivol is within normal range.

## 2020-06-10 ENCOUNTER — NURSE ONLY (OUTPATIENT)
Dept: CARDIOLOGY CLINIC | Age: 35
End: 2020-06-10
Payer: MEDICARE

## 2020-06-10 PROCEDURE — 93295 DEV INTERROG REMOTE 1/2/MLT: CPT | Performed by: INTERNAL MEDICINE

## 2020-06-10 PROCEDURE — 93296 REM INTERROG EVL PM/IDS: CPT | Performed by: INTERNAL MEDICINE

## 2020-07-16 ENCOUNTER — TELEPHONE (OUTPATIENT)
Dept: CARDIOLOGY CLINIC | Age: 35
End: 2020-07-16

## 2020-07-16 NOTE — TELEPHONE ENCOUNTER
Called home number rang three times and then got a busy tone. Called cell number and LVM for Patient to call back. If Patient calls back please give to myself or one of the EP nurses please.  Thanks

## 2020-07-16 NOTE — TELEPHONE ENCOUNTER
Spoke with the patient he is not sure when he can come. He is having chest pain and left arm numbness. He is having major stress at home. I advised him he should be evaluated at the ER. He does not think he can come in. I tried to convince him several times to come in.  I encouraged him to come in to the ED but I am uncertain if he will actually come in

## 2020-07-24 RX ORDER — DIGOXIN 125 MCG
TABLET ORAL
Qty: 30 TABLET | Refills: 0 | Status: SHIPPED | OUTPATIENT
Start: 2020-07-24 | End: 2020-07-29 | Stop reason: SDUPTHER

## 2020-07-24 NOTE — TELEPHONE ENCOUNTER
Escribe 30 days with no refills. Pt has not followed up with heart failure or EP in a year. He was recently shocked and refuses to seek treatment. (see notes from Davi Zapata and Evonne Dos Santos 7/16/20)    He is asking for his digoxin to be refilled because he thinks that being out of his meds is the reason he was shocked. Request received from pharmacy 7/23    I advised that follow up is required to continue medications refills. Transferred him to Call Center for scheduling. He has appt with EP on 8/13/20 and Tutu Mejia 7/27.

## 2020-07-28 NOTE — PROGRESS NOTES
[address]   Congestive Heart Failure    PrimaryCare Doctor:  . None (Inactive)  Primary Cardiologist: Wesley Pink       Chief Complaint:  CHF    History of Present Illness:  Martinez Beckford II is a 27 y.o. male with PMH NICM, HFrEF (20-25%), ICD, VF, AVR, and noncompliance with f/u who presents today for CHF f/u after ICD shock on 7/16/20 . He states he had been out of dig for about a month, and only takes ACE every other day. He does not do much and tells me that he has not been out of his house since last year when he came here for appt. Today he c/o intermittent chest tightness,  dyspnea, fatigue, syncope (with ICD shock), abd distension, and palpitations  He denies orthopnea, PND, exertional chest pressure/discomfort, early saiety, edema. Optivol was within range at time of shock. ER Visit: No  Recent Hospitalization: No    Baseline Weight: uk, no home wts  Wt Readings from Last 3 Encounters:   08/26/19 204 lb (92.5 kg)   06/24/19 200 lb 11.2 oz (91 kg)   01/31/18 184 lb (83.5 kg)          EF: 20-25%  Cardiac Imaging:  ECHO 8/20/19  -Left ventricular cavity size is severely dilated.   -Overall left ventricular systolic function appears severely reduced.   -Ejection fraction is visually estimated to be 20-25%.  -Indeterminate diastolic function.   -Mitral valve leaflets appear moderately thickened.   -Mild to moderate mitral regurgitation.   -A bioprosthetic artificial aortic valve appears well seated with a maximum   velocity of 22m/s and a mean gradient of 13mmHg.   -Pacer / ICD wire is visualized in the right ventricle.   -TAPSE = 1.58   -Pacemaker / ICD lead is visualized in the right atrium.   -IVC size is normal (<2.1cm) and collapses > 50% with respiration consistent   with normal RA pressure (3mmHg).    Procedures performed:  1-  Dr. Marisol Garcia  · Generator change out of dual chamber AICD   · Electronic analysis of lead and device.   · IV sedation.     Echo 3-11-14  LVEF 30%  Mean Gradient 15mmHg  No pulmonary hypertension identified    Device: Yes     Activity: at baseline, very sedentary  Can you walk 1-2 blocks or do a moderate amount of house/yard work? No      NYHA Class: III     Sodium Restrictions: 2g  Fluid Restrictions: 48-64 oz/day  Sodium and fluid restriction compliance: poor    Pt Education: The patient has received education on the following topics: dietary sodium restriction, heart failure medications, the importance of physical activity, symptom management and weight monitoring       Past Medical History:   has a past medical history of Aortic stenosis, CHF (congestive heart failure) ([address] and Hypertension. Surgical History:   has a past surgical history that includes Aortic valve surgery; pacemaker placement; and Cardiac valve replacement. Social History:   reports that he has never smoked. He uses smokeless tobacco. He reports current alcohol use. He reports that he does not use drugs. Family History:   Family History   Problem Relation Age of Onset    Heart Disease Paternal Grandmother        HomeMedications:  Prior to Admission medications    Medication Sig Start Date End Date Taking? Authorizing Provider   digoxin (LANOXIN) 125 MCG tablet TAKE ONE TABLET BY MOUTH DAILY 7/24/20   Shahida Levine MD   furosemide (LASIX) 20 MG tablet Take 1 tablet daily and an extra tablet as needed for weight gain of 3 pounds 6/26/19   Shahida Levine MD   lisinopril (PRINIVIL;ZESTRIL) 2.5 MG tablet Take 1 tablet by mouth daily 6/24/19   Shahida Levine MD   carvedilol (COREG) 6.25 MG tablet Take 1 tablet by mouth 2 times daily (with meals) 6/24/19   Shahida Levine MD   aspirin 81 MG tablet Take 81 mg by mouth daily. Historical Provider, MD        Allergies:  Flexeril [cyclobenzaprine]     ROS:   Review of Systems   Constitutional: Positive for fatigue. Respiratory: Positive for cough and shortness of breath. Cardiovascular: Positive for chest pain and palpitations.  Negative for leg swelling. Gastrointestinal: Positive for abdominal distention. Genitourinary: Negative. Musculoskeletal: Negative. Skin: Negative. Neurological: Negative. Psychiatric/Behavioral: Negative. Physical Examination:    Vitals:    07/29/20 0919   BP: 112/86   Site: Left Upper Arm   Position: Sitting   Cuff Size: Medium Adult   Pulse: 80   SpO2: 99%   Weight: 199 lb 1.6 oz (90.3 kg)   Height: 5' 6\" (1.676 m)           Physical Exam  Constitutional:       Appearance: Normal appearance. He is normal weight. HENT:      Head: Normocephalic and atraumatic. Eyes:      Extraocular Movements: Extraocular movements intact. Pupils: Pupils are equal, round, and reactive to light. Neck:      Musculoskeletal: Normal range of motion and neck supple. Cardiovascular:      Rate and Rhythm: Normal rate and regular rhythm. Pulses: Normal pulses. Heart sounds: Normal heart sounds. Pulmonary:      Effort: Pulmonary effort is normal.      Breath sounds: Normal breath sounds. Abdominal:      Palpations: Abdomen is soft. Musculoskeletal: Normal range of motion. Skin:     General: Skin is warm and dry. Neurological:      General: No focal deficit present. Mental Status: He is alert and oriented to person, place, and time. Mental status is at baseline. Psychiatric:         Mood and Affect: Mood normal.         Behavior: Behavior normal.         Thought Content:  Thought content normal.         Judgment: Judgment normal.         Lab Data:    CBC:   Lab Results   Component Value Date    WBC 4.9 01/31/2018    WBC 11.3 10/06/2015    RBC 5.06 10/06/2015    HGB 12.2 01/31/2018    HGB 14.2 10/06/2015    HCT 42.8 10/06/2015    MCV 84.7 10/06/2015    RDW 14.4 10/06/2015     01/31/2018     10/06/2015     BMP:  Lab Results   Component Value Date     10/06/2015    K 3.7 10/06/2015    CL 98 10/06/2015    CO2 25 10/06/2015    BUN 20 10/06/2015    CREATININE 0.9 01/31/2018

## 2020-07-29 ENCOUNTER — OFFICE VISIT (OUTPATIENT)
Dept: CARDIOLOGY CLINIC | Age: 35
End: 2020-07-29
Payer: MEDICARE

## 2020-07-29 VITALS
OXYGEN SATURATION: 99 % | WEIGHT: 199.1 LBS | BODY MASS INDEX: 32 KG/M2 | HEART RATE: 80 BPM | HEIGHT: 66 IN | SYSTOLIC BLOOD PRESSURE: 112 MMHG | DIASTOLIC BLOOD PRESSURE: 86 MMHG

## 2020-07-29 PROCEDURE — 99214 OFFICE O/P EST MOD 30 MIN: CPT | Performed by: NURSE PRACTITIONER

## 2020-07-29 PROCEDURE — G8417 CALC BMI ABV UP PARAM F/U: HCPCS | Performed by: NURSE PRACTITIONER

## 2020-07-29 PROCEDURE — G8427 DOCREV CUR MEDS BY ELIG CLIN: HCPCS | Performed by: NURSE PRACTITIONER

## 2020-07-29 PROCEDURE — 4004F PT TOBACCO SCREEN RCVD TLK: CPT | Performed by: NURSE PRACTITIONER

## 2020-07-29 RX ORDER — LISINOPRIL 2.5 MG/1
2.5 TABLET ORAL DAILY
Qty: 90 TABLET | Refills: 3 | Status: SHIPPED | OUTPATIENT
Start: 2020-07-29

## 2020-07-29 RX ORDER — FUROSEMIDE 20 MG/1
TABLET ORAL
Qty: 110 TABLET | Refills: 3 | Status: SHIPPED | OUTPATIENT
Start: 2020-07-29

## 2020-07-29 RX ORDER — DIGOXIN 125 MCG
TABLET ORAL
Qty: 90 TABLET | Refills: 3 | Status: SHIPPED | OUTPATIENT
Start: 2020-07-29

## 2020-07-29 RX ORDER — CARVEDILOL 6.25 MG/1
6.25 TABLET ORAL 2 TIMES DAILY WITH MEALS
Qty: 180 TABLET | Refills: 3 | Status: SHIPPED | OUTPATIENT
Start: 2020-07-29

## 2020-07-29 ASSESSMENT — ENCOUNTER SYMPTOMS
SHORTNESS OF BREATH: 1
COUGH: 1
ABDOMINAL DISTENTION: 1

## 2020-07-29 NOTE — PATIENT INSTRUCTIONS
Instructions:   1. Medications: continue current medications, take lisinopril every day  2. Labs: today  3. Lifestyle Recommendations: Weigh yourself every day in the morning after urination, call Seth Bryant if wt increases 2-3lb in one day or 5lb in one week, Limit sodium to 2000mg/day and fluids to 2L or 64oz/day.     4. Follow up: Dr Norah Harvey in August, Dr. Elizabeth Walker in 3months        [facility]: [phone]

## 2020-08-13 ENCOUNTER — OFFICE VISIT (OUTPATIENT)
Dept: CARDIOLOGY CLINIC | Age: 35
End: 2020-08-13

## 2020-09-15 ENCOUNTER — NURSE ONLY (OUTPATIENT)
Dept: CARDIOLOGY CLINIC | Age: 35
End: 2020-09-15
Payer: MEDICARE

## 2020-09-15 PROCEDURE — 93296 REM INTERROG EVL PM/IDS: CPT | Performed by: INTERNAL MEDICINE

## 2020-09-15 PROCEDURE — 93295 DEV INTERROG REMOTE 1/2/MLT: CPT | Performed by: INTERNAL MEDICINE

## 2020-09-15 PROCEDURE — 93297 REM INTERROG DEV EVAL ICPMS: CPT | Performed by: INTERNAL MEDICINE

## 2020-09-15 NOTE — LETTER
[address] [facility] [phone]  [address] [phone]    Pacemaker/Defibrillator Clinic          09/15/20        Leslie Claude II  [address] [place] [id number]        Dear Leslie Claude II    This letter is to inform you that we received the transmission from your monitor at home that checks your implanted heart device. The next date your monitor will automatically transmit will be 12-16-20. If your report needs attention we will notify you. Your device and monitor are wireless and most transmit cellularly, but please periodically check your monitor is still plugged in to the electrical outlet. If you still use the telephone land line to send please ensure the connection to the phone erick is secure. This will help to ensure successful automatic transmissions in the future. Also, the monitor needs to be close to you while sleeping at night. Please be aware that the remote device transmission sites are periodically monitored only during regular business hours during which simultaneous in-office device clinics are being run. If your transmission requires attention, we will contact you as soon as possible. Thank you.             [address]

## 2020-09-15 NOTE — PROGRESS NOTES
We received remote transmission from patient's monitor at home. Transmission shows normal sensing and pacing function. Noted NSVT. EP physician will review. See interrogation under cardiology tab in the [address] [facility] [address] field for more details. Optivol is within normal range.

## 2020-12-16 ENCOUNTER — NURSE ONLY (OUTPATIENT)
Dept: CARDIOLOGY CLINIC | Age: 35
End: 2020-12-16
Payer: MEDICARE

## 2020-12-16 PROCEDURE — 93295 DEV INTERROG REMOTE 1/2/MLT: CPT | Performed by: INTERNAL MEDICINE

## 2020-12-16 PROCEDURE — 93296 REM INTERROG EVL PM/IDS: CPT | Performed by: INTERNAL MEDICINE

## 2020-12-16 PROCEDURE — 93297 REM INTERROG DEV EVAL ICPMS: CPT | Performed by: INTERNAL MEDICINE

## 2020-12-16 NOTE — LETTER
[address] [facility] [phone]  [address] [facility] [phone]    Pacemaker/Defibrillator Clinic          12/16/20        Ricardo Vila II  [address] [place] [address]        Dear Ricardo Vila II    This letter is to inform you that we received the transmission from your monitor at home that checks your implanted heart device. The next date your monitor will automatically transmit will be 3-22-21. If your report needs attention we will notify you. Your device and monitor are wireless and most transmit cellularly, but please periodically check your monitor is still plugged in to the electrical outlet. If you still use the telephone land line to send please ensure the connection to the phone erick is secure. This will help to ensure successful automatic transmissions in the future. Also, the monitor needs to be close to you while sleeping at night. Please be aware that the remote device transmission sites are periodically monitored only during regular business hours during which simultaneous in-office device clinics are being run. If your transmission requires attention, we will contact you as soon as possible. Thank you.             [address]

## 2021-12-11 ENCOUNTER — TELEPHONE (OUTPATIENT)
Dept: CARDIOLOGY CLINIC | Age: 36
End: 2021-12-11

## 2021-12-12 NOTE — TELEPHONE ENCOUNTER
Call placed to the patient in regards to not receiving remote transmission. Last transmission received was : 02/02/2021     [facility] for patient to return call. Needing patient to send in manual transmission.  If there is an issue with device patient may call [facility] at [phone]

## 2022-11-03 ENCOUNTER — NURSE ONLY (OUTPATIENT)
Dept: CARDIOLOGY CLINIC | Age: 37
End: 2022-11-03
Payer: MEDICARE

## 2022-11-03 DIAGNOSIS — Z95.810 CARDIAC DEFIBRILLATOR IN PLACE: ICD-10-CM

## 2022-11-03 DIAGNOSIS — I50.22 CHRONIC SYSTOLIC HEART FAILURE (HCC): ICD-10-CM

## 2022-11-03 DIAGNOSIS — I42.8 NON-ISCHEMIC CARDIOMYOPATHY (HCC): Primary | ICD-10-CM

## 2022-11-03 PROCEDURE — 93290 INTERROG DEV EVAL ICPMS IP: CPT | Performed by: INTERNAL MEDICINE

## 2022-11-03 PROCEDURE — 93289 INTERROG DEVICE EVAL HEART: CPT | Performed by: INTERNAL MEDICINE

## 2022-11-03 NOTE — PROGRESS NOTES
We received remote transmission from [facility] at a [facility] ER. Transmission shows normal sensing and pacing function. Pt was shocked for VT. Pace-Terminated Episodes 1 of 1 0  Shock-Terminated Episodes 1 of 1 0  Total Shocks 1     Optivol is within normal range. Total  < 0.1% (MVP On)  AS-VS 94.9%  AS- < 0.1%  AP-VS 5.1%  AP- < 0.1%    Patient has been non compliant with us. This is 1st transmission seen in close to 2 years. EP physician will review. See interrogation under cardiology tab in the [address] [place] [address] [address] field for more details.